# Patient Record
(demographics unavailable — no encounter records)

---

## 2024-11-13 NOTE — PROCEDURE
[See Device Printout] : See device printout [Voltage: ___ volts] : Voltage was [unfilled] volts [Threshold Testing Performed] : Threshold testing was performed [Lead Imp:  ___ohms] : lead impedance was [unfilled] ohms [Sensing Amplitude ___mv] : sensing amplitude was [unfilled] mv [___V @] : [unfilled] V [___ ms] : [unfilled] ms [Counters Reset] : the counters were reset [de-identified] : Hx AF ablation, hx watchman [de-identified] : [employer] [de-identified] : [employer]  [de-identified] : [id number] [de-identified] : 3-14-18 [de-identified] : AAIR<=>DDDR [de-identified] :  [de-identified] : 3 years longevity [de-identified] : AP 96.8%  <0.1%  Multiple events labeled NSVT, however, they are actually AT.  Most 1 second in the 150's.   Pt wishes Q 3 month in office interrogations.    3 month DVC.

## 2024-11-13 NOTE — HISTORY OF PRESENT ILLNESS
[de-identified] : 88 year old female s/p PPM 3/13/18 @ [facility].  Hx of PAF, s/p ablation and watchman device. Per notes stored on MDT PPM- LEAVE CAPTURE MANAGEMENT OFF.

## 2024-12-05 NOTE — DISCUSSION/SUMMARY
[FreeTextEntry1] : MIRIAM GILLIES is a 88 year old F who presents today Dec 03, 2024 with the above history and the following active issues: Afib On Flecinide and Diltiazem. She only takes ASA as she is concerned about taking DOACS She will contniue with Lasix 20 mg BIW and PRN. Follow up labs with PCP Discussed red flag symptoms, which would warrant sooner or emergent medical evaluation. Any questions and concerns were addressed and resolved. I spent 30 minutes with the patient: 5 minutes in preparation of the visit. 15 minutes face to face and 10 minutes on documentation and coordination of care.  5 mins spent reviewing patients' history, testing, medications and plan with supervising MD:

## 2024-12-05 NOTE — HISTORY OF PRESENT ILLNESS
[FreeTextEntry1] : Sheri  is a pleasant 88-year-old female with past medical history of PAF, status post ablation x 2 several years ago, and watchman device in 2018 . Postprocedure course was complicated by pericardial effusion requiring pericardiocentesis and episodes of A. fib with rapid ventricular response and junctional bradycardia. She was then implanted with PPM for tachybradycardia syndrome. She recently have 2 brief ( 1second) episode of SVT She was unaware of them. No hospitalizations  No c/o SOB, ONTIVEROS ,  She is very concerned about her macular degeneration She is un able to drive any more. S/he denies chest pain, pressure, palpitations, unusual shortness of breath, orthopnea, LE edema, lightheadedness, dizziness, near syncope or syncope.  Past visit Of note: Patient  had  pericardial tap x 2 (and also pleural tap). She was put on amiodarone - had significant side effects and was stopped. She was then started on flecainide Which she is tolerating. Sotalol did not work.   She has history of paroxysmal atrial fibrillation since 2005.    Echo 11/2022 reviewed today: EF 65-70%. RV mildly enlarged. Mod MR (which has remained stable) and mild-mod TR

## 2025-03-04 NOTE — PROCEDURE
[See Device Printout] : See device printout [Voltage: ___ volts] : Voltage was [unfilled] volts [Threshold Testing Performed] : Threshold testing was performed [Lead Imp:  ___ohms] : lead impedance was [unfilled] ohms [Sensing Amplitude ___mv] : sensing amplitude was [unfilled] mv [___V @] : [unfilled] V [___ ms] : [unfilled] ms [Counters Reset] : the counters were reset [de-identified] : Hx AF ablation, hx watchman [de-identified] : [employer] [de-identified] : [employer]  [de-identified] : [id number] [de-identified] : 3-14-18 [de-identified] : AAIR<=>DDDR [de-identified] :  [de-identified] : 3 years longevity [de-identified] : AP 94.1%  <0.1%  Multiple events labeled NSVT, however, they are actually AT.  Most 1 second in the 150's.  PAF noted.   Device outputs were adjusted to reflect threshold testing 2 time safety margin. Pt wishes Q 3 month in office interrogations.    3 month DVC.

## 2025-03-04 NOTE — HISTORY OF PRESENT ILLNESS
[de-identified] : 88 year old female s/p PPM 3/13/18 @ [facility].  Hx of PAF, s/p ablation and watchman device. Per notes stored on MDT PPM- LEAVE CAPTURE MANAGEMENT OFF.

## 2025-03-04 NOTE — HISTORY OF PRESENT ILLNESS
[de-identified] : 88 year old female s/p PPM 3/13/18 @ [facility].  Hx of PAF, s/p ablation and watchman device. Per notes stored on MDT PPM- LEAVE CAPTURE MANAGEMENT OFF.

## 2025-06-20 NOTE — DISCUSSION/SUMMARY
[FreeTextEntry1] : MIRIAM GILLIES is a 89 year old F -she now appears frail She had complete device check today. Afib On Flecinide and Diltiazem. She only takes ASA as she is concerned about taking DOACS.  On device check, she had episodes on 6/10/2023 and 6/13/2023. She will contniue with Lasix 20 mg BIW and PRN. On x-ray on 6/17/2025, new small bilateral pleural effusions were noted compared to May 2023.  I have asked the patient to follow-up with Dr. Nam who is her pulmonary. Follow-up after 6 months.  Earlier if needed.

## 2025-06-20 NOTE — PROCEDURE
[See Device Printout] : See device printout [Voltage: ___ volts] : Voltage was [unfilled] volts [Threshold Testing Performed] : Threshold testing was performed [Lead Imp:  ___ohms] : lead impedance was [unfilled] ohms [Sensing Amplitude ___mv] : sensing amplitude was [unfilled] mv [___V @] : [unfilled] V [___ ms] : [unfilled] ms [Counters Reset] : the counters were reset [de-identified] : Hx AF ablation, hx watchman [de-identified] : [employer] [de-identified] : [employer]  [de-identified] : [id number] [de-identified] : 3-14-18 [de-identified] : AAIR<=>DDDR [de-identified] :  [de-identified] : 2.5 years  [de-identified] : AP 89.8%  <0.1%  4 AFib episodes noted, 6/10/25, 6/13/25. Asymptomatic. No OAC. On BB and Flecainide.  PAF noted.  Device outputs were adjusted to reflect threshold testing 2-time safety margin. Pt wishes Q 3 month in office interrogations.    3 month DVC.  OV with PD today.  Limitations of non-invasive testing reviewed. Red flag symptoms which would warrant sooner emergent evaluation reviewed with the patient. All questions and concerns were addressed and answered.   Sincerely,  Camryn Peterson, [facility] Patient's history, testing, medications and any relative changes in plan of care reviewed with supervising MD: Dr. Terry Dodson

## 2025-06-20 NOTE — HISTORY OF PRESENT ILLNESS
[de-identified] : 88 year old female s/p PPM 3/13/18 @ [facility].  Hx of PAF, s/p ablation and watchman device.  Per notes stored on MDT PPM- LEAVE CAPTURE MANAGEMENT OFF.

## 2025-06-20 NOTE — HISTORY OF PRESENT ILLNESS
[FreeTextEntry1] : Sheri  is a pleasant 89-year-old female with past medical history of PAF, status post ablation x 2 several years ago, and watchman device in 2018 . Postprocedure course was complicated by pericardial effusion requiring pericardiocentesis and episodes of A. fib with rapid ventricular response and junctional bradycardia. She was then implanted with PPM for tachybradycardia syndrome.  She had a device check today.  No hospitalizations  No c/o SOB, ONTIVEROS ,  She is very concerned about her macular degeneration She is un able to drive any more. S/he denies chest pain, pressure, palpitations, unusual shortness of breath, orthopnea, LE edema, lightheadedness, dizziness, near syncope or syncope.  History: Of note: Patient  had  pericardial tap x 2 (and also pleural tap). She was put on amiodarone - had significant side effects and was stopped. She was then started on flecainide Which she is tolerating. Sotalol did not work.   She has history of paroxysmal atrial fibrillation since 2005.    Echo 11/2022 reviewed today: EF 65-70%. RV mildly enlarged. Mod MR (which has remained stable) and mild-mod TR